# Patient Record
Sex: FEMALE | Race: WHITE
[De-identification: names, ages, dates, MRNs, and addresses within clinical notes are randomized per-mention and may not be internally consistent; named-entity substitution may affect disease eponyms.]

---

## 2021-03-25 NOTE — P.HPOB
History of Present Illness


H&P Date: 03/25/21


Chief Complaint: Redundant labia





Traci is a 26-year-old female with left-sided redundant labia.  It catches on 

clothing and causes discomfort and pain and therefore we are doing a 

labioplasty.  Risks/benefits/alternatives reviewed with patient in detail and 

all questions were answered for her prior to proceeding to the operative room.  

She is aware there is potential for bleeding and infection as well as since this

has not been done specifically cosmesis it may not be completely the same on 

both sides and potentially she may even need further surgery at some point.  She

relates that the baby did start getting larger when she was a child and she had 

an injury to that area and had hypertrophied out from there.





Exam


Osteopathic Statement: *.  No significant issues noted on an osteopathic 

structural exam other than those noted in the History and Physical/Consult.





- OBG Physical Exam


Breast: both: normal (no masses)


Abdomen: bowel sounds normal, no diffuse tenderness, no bruit present, no 

guarding noted, no hepatomegaly, no splenomegaly, no mass


Vulva: both: normal


Vagina: normal moisture, no discharge


Cervix: no lesion, no discharge


Uterus: normal size, normal contour


Adnexa: both: normal


Anus/Rectum: normal perianal skin, no rectal mass, no hemorrhoids, heme negative

## 2021-03-30 ENCOUNTER — HOSPITAL ENCOUNTER (OUTPATIENT)
Dept: HOSPITAL 47 - OR | Age: 26
Discharge: HOME | End: 2021-03-30
Attending: OBSTETRICS & GYNECOLOGY
Payer: COMMERCIAL

## 2021-03-30 VITALS — HEART RATE: 87 BPM | SYSTOLIC BLOOD PRESSURE: 117 MMHG | DIASTOLIC BLOOD PRESSURE: 72 MMHG

## 2021-03-30 VITALS — TEMPERATURE: 97.4 F

## 2021-03-30 VITALS — RESPIRATION RATE: 16 BRPM

## 2021-03-30 VITALS — BODY MASS INDEX: 24.1 KG/M2

## 2021-03-30 DIAGNOSIS — N90.69: Primary | ICD-10-CM

## 2021-03-30 PROCEDURE — 56620 VULVECTOMY SIMPLE PARTIAL: CPT

## 2021-03-30 PROCEDURE — 81025 URINE PREGNANCY TEST: CPT

## 2021-03-30 PROCEDURE — 88302 TISSUE EXAM BY PATHOLOGIST: CPT

## 2021-03-30 RX ADMIN — HYDROMORPHONE HYDROCHLORIDE PRN MG: 1 INJECTION, SOLUTION INTRAMUSCULAR; INTRAVENOUS; SUBCUTANEOUS at 08:32

## 2021-03-30 RX ADMIN — HYDROMORPHONE HYDROCHLORIDE PRN MG: 1 INJECTION, SOLUTION INTRAMUSCULAR; INTRAVENOUS; SUBCUTANEOUS at 08:45

## 2021-03-30 NOTE — P.OP
Date of Procedure: 03/30/21


Preoperative Diagnosis: 


Redundant labia


Postoperative Diagnosis: 


Same


Procedure(s) Performed: 


Labioplasty left labial


Anesthesia: ANTELMO


Surgeon: Yovanny Moya


Estimated Blood Loss (ml): 5


Pathology: none sent (Labia left side)


Condition: stable


Disposition: same day


Operative Findings: 


Pathology pending


Description of Procedure: 


Patient was taken to the operating suite where general anesthetic was found be 

adequate.  She was prepped and draped in the normal sterile fashion and placed 

in the dorsal lithotomy position.  Initially the labia was identified and marked

excision.  Quarter percent Marcaine was then injected submucosally and using a 

small scissors the labia was reduced.  Once it was felt to be equal with the 

right side interrupted 4-0 Vicryl suture was placed to obtain hemostasis.  Once 

this was concluded Estrace was placed and a Telfa was placed on top bacitracin 

area she was then taken to the recovery room in stable and satisfactory 

condition.  Sponge, lap, needle counts were all correct 2.





Plan - Discharge Summary


Discharge Rx Participant: No


New Discharge Prescriptions: 


New


   Ibuprofen [Motrin] 600 mg PO Q6HR PRN #30 tab


     PRN Reason: Pain





No Action


   Albuterol Inhaler [Ventolin Hfa Inhaler] 1 puff INHALATION AS DIRECTED PRN


     PRN Reason: Asthma


Discharge Medication List





Albuterol Inhaler [Ventolin Hfa Inhaler] 1 puff INHALATION AS DIRECTED PRN 

03/26/21 [History]


Ibuprofen [Motrin] 600 mg PO Q6HR PRN #30 tab 03/30/21 [Rx]








Follow up Appointment(s)/Referral(s): 


Yovanny Moya DO [Doctor of Osteopathic Medicine] - 1 Week


Activity/Diet/Wound Care/Special Instructions: 


No heavy lifting, limit stairs and driving, and complete pelvic rest.  If any 

high temperatures, heavy bleeding, or severe pain notify our office


Discharge Disposition: HOME SELF-CARE